# Patient Record
Sex: FEMALE | Race: BLACK OR AFRICAN AMERICAN | NOT HISPANIC OR LATINO | Employment: FULL TIME | URBAN - NONMETROPOLITAN AREA
[De-identification: names, ages, dates, MRNs, and addresses within clinical notes are randomized per-mention and may not be internally consistent; named-entity substitution may affect disease eponyms.]

---

## 2023-05-21 ENCOUNTER — HOSPITAL ENCOUNTER (EMERGENCY)
Facility: HOSPITAL | Age: 53
Discharge: LEFT BEFORE DISCHARGE | End: 2023-05-21
Payer: COMMERCIAL

## 2023-05-21 VITALS
HEART RATE: 76 BPM | WEIGHT: 293 LBS | RESPIRATION RATE: 20 BRPM | OXYGEN SATURATION: 97 % | BODY MASS INDEX: 47.09 KG/M2 | TEMPERATURE: 98 F | HEIGHT: 66 IN | DIASTOLIC BLOOD PRESSURE: 80 MMHG | SYSTOLIC BLOOD PRESSURE: 152 MMHG

## 2023-05-21 DIAGNOSIS — T14.91XA SUICIDE ATTEMPT: Primary | ICD-10-CM

## 2023-05-21 LAB
ALBUMIN SERPL BCP-MCNC: 3.2 G/DL (ref 3.5–5)
ALBUMIN/GLOB SERPL: 0.8 {RATIO}
ALP SERPL-CCNC: 87 U/L (ref 41–108)
ALT SERPL W P-5'-P-CCNC: 21 U/L (ref 13–56)
ANION GAP SERPL CALCULATED.3IONS-SCNC: 11 MMOL/L (ref 7–16)
APAP SERPL-MCNC: <2 ΜG/ML (ref 10–30)
AST SERPL W P-5'-P-CCNC: 16 U/L (ref 15–37)
BASOPHILS # BLD AUTO: 0.02 K/UL (ref 0–0.2)
BASOPHILS NFR BLD AUTO: 0.2 % (ref 0–1)
BILIRUB SERPL-MCNC: 0.2 MG/DL (ref ?–1.2)
BUN SERPL-MCNC: 19 MG/DL (ref 7–18)
BUN/CREAT SERPL: 21 (ref 6–20)
CALCIUM SERPL-MCNC: 8.7 MG/DL (ref 8.5–10.1)
CHLORIDE SERPL-SCNC: 102 MMOL/L (ref 98–107)
CO2 SERPL-SCNC: 30 MMOL/L (ref 21–32)
CREAT SERPL-MCNC: 0.9 MG/DL (ref 0.55–1.02)
DIFFERENTIAL METHOD BLD: NORMAL
EGFR (NO RACE VARIABLE) (RUSH/TITUS): 77 ML/MIN/1.73M2
EOSINOPHIL # BLD AUTO: 0.12 K/UL (ref 0–0.5)
EOSINOPHIL NFR BLD AUTO: 1.4 % (ref 1–4)
ERYTHROCYTE [DISTWIDTH] IN BLOOD BY AUTOMATED COUNT: 14.5 % (ref 11.5–14.5)
ETHANOL SERPL-MCNC: 3 MG/DL
GLOBULIN SER-MCNC: 3.8 G/DL (ref 2–4)
GLUCOSE SERPL-MCNC: 94 MG/DL (ref 74–106)
HCT VFR BLD AUTO: 38.6 % (ref 38–47)
HGB BLD-MCNC: 12.6 G/DL (ref 12–16)
LYMPHOCYTES # BLD AUTO: 2.73 K/UL (ref 1–4.8)
LYMPHOCYTES NFR BLD AUTO: 31 % (ref 27–41)
MCH RBC QN AUTO: 28.8 PG (ref 27–31)
MCHC RBC AUTO-ENTMCNC: 32.6 G/DL (ref 32–36)
MCV RBC AUTO: 88.3 FL (ref 80–96)
MONOCYTES # BLD AUTO: 0.44 K/UL (ref 0–0.8)
MONOCYTES NFR BLD AUTO: 5 % (ref 2–6)
MPC BLD CALC-MCNC: 9.7 FL (ref 9.4–12.4)
NEUTROPHILS # BLD AUTO: 5.51 K/UL (ref 1.8–7.7)
NEUTROPHILS NFR BLD AUTO: 62.4 % (ref 53–65)
PLATELET # BLD AUTO: 330 K/UL (ref 150–400)
POTASSIUM SERPL-SCNC: 3.9 MMOL/L (ref 3.5–5.1)
PROT SERPL-MCNC: 7 G/DL (ref 6.4–8.2)
RBC # BLD AUTO: 4.37 M/UL (ref 4.2–5.4)
SALICYLATES SERPL-MCNC: 1.6 MG/DL (ref 3–30)
SODIUM SERPL-SCNC: 139 MMOL/L (ref 136–145)
WBC # BLD AUTO: 8.82 K/UL (ref 4.5–11)

## 2023-05-21 PROCEDURE — 82077 ASSAY SPEC XCP UR&BREATH IA: CPT | Performed by: NURSE PRACTITIONER

## 2023-05-21 PROCEDURE — 85025 COMPLETE CBC W/AUTO DIFF WBC: CPT | Performed by: NURSE PRACTITIONER

## 2023-05-21 PROCEDURE — 80143 DRUG ASSAY ACETAMINOPHEN: CPT | Performed by: NURSE PRACTITIONER

## 2023-05-21 PROCEDURE — 99284 EMERGENCY DEPT VISIT MOD MDM: CPT | Mod: 25

## 2023-05-21 PROCEDURE — 25000003 PHARM REV CODE 250: Performed by: NURSE PRACTITIONER

## 2023-05-21 PROCEDURE — 80179 DRUG ASSAY SALICYLATE: CPT | Performed by: NURSE PRACTITIONER

## 2023-05-21 PROCEDURE — 80053 COMPREHEN METABOLIC PANEL: CPT | Performed by: NURSE PRACTITIONER

## 2023-05-21 PROCEDURE — 99284 EMERGENCY DEPT VISIT MOD MDM: CPT | Mod: ,,, | Performed by: REGISTERED NURSE

## 2023-05-21 PROCEDURE — 96360 HYDRATION IV INFUSION INIT: CPT

## 2023-05-21 PROCEDURE — 99284 PR EMERGENCY DEPT VISIT,LEVEL IV: ICD-10-PCS | Mod: ,,, | Performed by: REGISTERED NURSE

## 2023-05-21 RX ORDER — ESCITALOPRAM OXALATE 10 MG/1
10 TABLET ORAL
COMMUNITY
Start: 2023-05-02

## 2023-05-21 RX ORDER — ATENOLOL AND CHLORTHALIDONE TABLET 50; 25 MG/1; MG/1
1 TABLET ORAL
COMMUNITY
Start: 2023-05-02

## 2023-05-21 RX ADMIN — SODIUM CHLORIDE 1000 ML: 9 INJECTION, SOLUTION INTRAVENOUS at 05:05

## 2023-05-21 NOTE — ED TRIAGE NOTES
Received call from [facility] , [place],.  Patient took 15-20  300 mg Gabapentin approx. 45 minutes PTA.  Needs supportive care, watch for drowsiness and hypotension.  Collect ASA, Tylenol, UDS and CMP.  Brought to ED by [facility] from home on [address].  Medic reports patient desolved 15 300 mg. Gabapentin in hot water and drank a portion of this and spit out the rest.  Patient admits to being drowsy and feeling nauseated.  Patient admits to making previous attempts on her life.  Was treated at [facility] several years ago.  Poor eye contact, tearful with conversation.

## 2023-05-21 NOTE — ED PROVIDER NOTES
"Encounter Date: 5/21/2023       History     Chief Complaint   Patient presents with    Suicide Attempt     51 y/o AAF brought to the ED by [facility] for evaluation after suicide attempt. Patient took 15 to 20 gabapentin 300 mg capsules in her mouth, drank warm water, let caps dissolve then spat out remainder of capsules 45 min PTA. States "I just wanted to go to sleep and not wake up". Reports feeling sleepy and dizzy. Denies any other complaints. Patient has been dealing with depression her whole life, but became worse on Thursday after her mother brought up past problems. Attempted suicide at age 12, 13, 18, 24 and 29. She was treated at [facility] in the past.   Lives with boyfriend of 13 years. Denies problem at home.      The history is provided by the patient.   Review of patient's allergies indicates:   Allergen Reactions    Meloxicam      Past Medical History:   Diagnosis Date    Depression     Hypertension      Past Surgical History:   Procedure Laterality Date    HYSTERECTOMY      JOINT REPLACEMENT       History reviewed. No pertinent family history.  Social History     Tobacco Use    Smoking status: Never    Smokeless tobacco: Never   Substance Use Topics    Alcohol use: Yes     Alcohol/week: 1.0 standard drink     Types: 1 Standard drinks or equivalent per week    Drug use: Never     Review of Systems   Constitutional:  Positive for fatigue. Negative for activity change, appetite change and fever.   HENT: Negative.  Negative for congestion, ear pain, sinus pressure, sinus pain, sore throat and trouble swallowing.    Eyes: Negative.  Negative for photophobia, pain and visual disturbance.   Respiratory: Negative.  Negative for cough, chest tightness and shortness of breath.    Cardiovascular: Negative.  Negative for chest pain and leg swelling.   Gastrointestinal: Negative.  Negative for abdominal pain, constipation, diarrhea, nausea and vomiting.   Genitourinary: Negative.  Negative for difficulty " urinating, dysuria and frequency.   Musculoskeletal: Negative.  Negative for back pain, neck pain and neck stiffness.   Skin: Negative.  Negative for rash.   Neurological:  Positive for dizziness, light-headedness and headaches. Negative for weakness.   Hematological: Negative.  Negative for adenopathy. Does not bruise/bleed easily.   Psychiatric/Behavioral:  Positive for sleep disturbance and suicidal ideas. Negative for agitation and behavioral problems. The patient is not nervous/anxious.         Depression     Physical Exam     Initial Vitals [05/21/23 1650]   BP Pulse Resp Temp SpO2   (!) 166/90 78 13 98.2 °F (36.8 °C) 98 %      MAP       --         Physical Exam    Nursing note and vitals reviewed.  Constitutional: She appears well-developed and well-nourished. She is Obese . She is cooperative. She does not have a sickly appearance. She does not appear ill.   HENT:   Head: Normocephalic and atraumatic.   Right Ear: External ear normal.   Left Ear: External ear normal.   Nose: Nose normal.   Mouth/Throat: Uvula is midline, oropharynx is clear and moist and mucous membranes are normal.   Eyes: Conjunctivae, EOM and lids are normal. Pupils are equal, round, and reactive to light.   Neck: Neck supple.   Normal range of motion.   Full passive range of motion without pain.     Cardiovascular:  Normal rate, regular rhythm, normal heart sounds, intact distal pulses and normal pulses.           Pulmonary/Chest: Effort normal and breath sounds normal. No respiratory distress.   Abdominal: Abdomen is soft. Bowel sounds are normal. There is no abdominal tenderness.   Musculoskeletal:         General: Normal range of motion.      Cervical back: Full passive range of motion without pain, normal range of motion and neck supple.     Neurological: She is alert and oriented to person, place, and time. She has normal strength. No cranial nerve deficit. GCS eye subscore is 4. GCS verbal subscore is 5. GCS motor subscore is 6.    Skin: Skin is warm and dry. Capillary refill takes less than 2 seconds. No rash noted.   Psychiatric: Her speech is normal. She is withdrawn. Thought content is delusional. Thought content is not paranoid. Cognition and memory are normal. She exhibits a depressed mood. She expresses suicidal ideation. She expresses no homicidal ideation. She expresses suicidal plans. She expresses no homicidal plans.       Medical Screening Exam   See Full Note    ED Course   Procedures  Labs Reviewed   COMPREHENSIVE METABOLIC PANEL - Abnormal; Notable for the following components:       Result Value    BUN 19 (*)     BUN/Creatinine Ratio 21 (*)     Albumin 3.2 (*)     All other components within normal limits   ACETAMINOPHEN LEVEL - Abnormal; Notable for the following components:    Acetaminophen <2 (*)     All other components within normal limits   SALICYLATE LEVEL - Abnormal; Notable for the following components:    Salicylate 1.6 (*)     All other components within normal limits   ALCOHOL,MEDICAL (ETHANOL) - Normal   CBC W/ AUTO DIFFERENTIAL    Narrative:     The following orders were created for panel order CBC auto differential.  Procedure                               Abnormality         Status                     ---------                               -----------         ------                     CBC with Differential[[id number]]                            Final result                 Please view results for these tests on the individual orders.   CBC WITH DIFFERENTIAL   URINALYSIS, REFLEX TO URINE CULTURE   DRUG SCREEN, URINE (BEAKER)          Imaging Results    None          Medications   sodium chloride 0.9% bolus 1,000 mL 1,000 mL (0 mLs Intravenous Stopped 5/21/23 1840)     Medical Decision Making:   ED Management:  -Lab results back.  Pt is without complaints at this time.  -States she does not want to be transferred to a psych facility.  She wishes to go home.  She has a good support system at home with her live  "in boyfriend Hebrert.  She agrees to signing a no harm agreement and states "I'm not going to do this again".  Will discuss the situation with her and Herbert on his return to ED.  -Pt states she took the gabapentin pills "around 3 pm".  1/2 life of gabapentin is 5 hours.  Pt denies any drowsiness and her BP is stable.  Pt continues to state she has no thoughts of harming herself.  She agrees to schedule outpatient Mental Health counseling tomorrow.  -Dalton "Herbert" Crawford agrees to take responsibility, monitor, and supervise pt and prevent pt from self harm.                       Clinical Impression:   Final diagnoses:  [T14.91XA] Suicide attempt (Primary)        ED Disposition Condition    Discharge Stable          ED Prescriptions    None       Follow-up Information       Follow up With Specialties Details Why Contact Info    Mental Health Provider of your choice  In 1 day For outpatient counseling              HATTIE Atkinson  05/21/23 2010    "

## 2023-05-21 NOTE — Clinical Note
"Araceli Garcia (Cassandra) was seen and treated in our emergency department on 5/21/2023.  She may return to work on 05/25/2023.       If you have any questions or concerns, please don't hesitate to call.      Jes Gaines NP-C"

## 2023-05-22 NOTE — ED NOTES
GAVE 30 GABAPENTIN 300MG TO DERRICKLEYLA SAMUELS IN A GREEN BOTTLE PRESCRIBED TO LU LOCK . SHE IS IN AGREEMENT WITH ME GIVING THIS MEDICATION TO HIM

## 2023-05-22 NOTE — DISCHARGE INSTRUCTIONS
-You have agreed to a No Harm Agreement in writing.  You agree to seek medical attention and not harm yourself if you have feelings of harming yourself.  -Call tomorrow and schedule outpatient counseling

## 2023-05-22 NOTE — ED NOTES
"SPOKE WITH [facility] AT [facility]. UPDATED EFRA ON CURRENT MENTATION AND VITAL SIGNS, PT IS ALERT AND ORIENTED X3 AND TO SITUATION, ALSO THAT PATIENT WISHED TO LEAVE AGAINST MEDICAL ADVICE. EFRA STATED "WITH THE AMOUNT THE PATIENT SAID THAT SHE TOOK SHE SHOULD BE SLEEPY" SHE IS NOT SLEEPY. NO FURTHER WILL BE NEEDED FROM [facility] PER ,EFRA.  "